# Patient Record
Sex: MALE | Race: WHITE | NOT HISPANIC OR LATINO | URBAN - METROPOLITAN AREA
[De-identification: names, ages, dates, MRNs, and addresses within clinical notes are randomized per-mention and may not be internally consistent; named-entity substitution may affect disease eponyms.]

---

## 2017-08-16 ENCOUNTER — FOLLOW UP ESTABLISHED (OUTPATIENT)
Dept: URBAN - METROPOLITAN AREA CLINIC 51 | Facility: CLINIC | Age: 77
End: 2017-08-16
Payer: MEDICARE

## 2017-08-16 DIAGNOSIS — H04.123 TEAR FILM INSUFFICIENCY OF BILATERAL LACRIMAL GLANDS: Primary | ICD-10-CM

## 2017-08-16 PROCEDURE — 92134 CPTRZ OPH DX IMG PST SGM RTA: CPT | Performed by: OPTOMETRIST

## 2017-08-16 PROCEDURE — 92014 COMPRE OPH EXAM EST PT 1/>: CPT | Performed by: OPTOMETRIST

## 2017-08-16 RX ORDER — BRIMONIDINE TARTRATE 1.5 MG/ML
0.15 % SOLUTION/ DROPS OPHTHALMIC
Qty: 1 | Refills: 3 | Status: INACTIVE
Start: 2017-08-16 | End: 2018-08-22

## 2017-08-16 ASSESSMENT — INTRAOCULAR PRESSURE
OD: 12
OS: 12

## 2018-08-22 ENCOUNTER — FOLLOW UP ESTABLISHED (OUTPATIENT)
Dept: URBAN - METROPOLITAN AREA CLINIC 51 | Facility: CLINIC | Age: 78
End: 2018-08-22
Payer: MEDICARE

## 2018-08-22 DIAGNOSIS — H35.372 PUCKERING OF MACULA, LEFT EYE: Primary | ICD-10-CM

## 2018-08-22 DIAGNOSIS — Z96.1 PRESENCE OF INTRAOCULAR LENS: ICD-10-CM

## 2018-08-22 DIAGNOSIS — H02.834 DERMATOCHALASIS OF LEFT UPPER LID: ICD-10-CM

## 2018-08-22 DIAGNOSIS — H02.831 DERMATOCHALASIS OF RIGHT UPPER LID: ICD-10-CM

## 2018-08-22 PROCEDURE — 92014 COMPRE OPH EXAM EST PT 1/>: CPT | Performed by: OPTOMETRIST

## 2018-08-22 PROCEDURE — 92134 CPTRZ OPH DX IMG PST SGM RTA: CPT | Performed by: OPTOMETRIST

## 2018-08-22 ASSESSMENT — INTRAOCULAR PRESSURE
OD: 15
OS: 14

## 2018-08-22 ASSESSMENT — VISUAL ACUITY
OD: 20/20
OS: 20/20

## 2021-07-06 ENCOUNTER — OFFICE VISIT (OUTPATIENT)
Dept: URBAN - METROPOLITAN AREA CLINIC 51 | Facility: CLINIC | Age: 81
End: 2021-07-06
Payer: MEDICARE

## 2021-07-06 DIAGNOSIS — H43.313 VITREOUS MEMBRANES AND STRANDS, BILATERAL: ICD-10-CM

## 2021-07-06 DIAGNOSIS — H52.4 PRESBYOPIA: ICD-10-CM

## 2021-07-06 DIAGNOSIS — H35.61 RETINAL HEMORRHAGE, RIGHT EYE: ICD-10-CM

## 2021-07-06 PROCEDURE — 92134 CPTRZ OPH DX IMG PST SGM RTA: CPT | Performed by: OPTOMETRIST

## 2021-07-06 PROCEDURE — 92014 COMPRE OPH EXAM EST PT 1/>: CPT | Performed by: OPTOMETRIST

## 2021-07-06 ASSESSMENT — VISUAL ACUITY
OS: 20/40
OD: 20/30

## 2021-07-06 ASSESSMENT — KERATOMETRY
OS: 42.27
OD: 42.41

## 2021-07-06 ASSESSMENT — INTRAOCULAR PRESSURE
OS: 14
OD: 14

## 2021-07-06 NOTE — IMPRESSION/PLAN
Impression: Retinal hemorrhage, right eye: H35.61. Plan: New small flame hemorrhage OD only. Educated patient on condition. Patient reports BP is wnl. but states his cholesterol was previously elevated and is working with his PCP to get it better controlled. Follow up with PCP as scheduled. Will recheck in 3-4 months.

## 2021-07-06 NOTE — IMPRESSION/PLAN
Impression: Presence of intraocular lens ; OU Plan: Clear and centered IOL's OU. Open capsules. Stable.

## 2021-07-06 NOTE — IMPRESSION/PLAN
Impression: Puckering of macula, left eye Plan: Mild thickening, no CME OS. Does appear to be affecting patients vision at this time. Will plan to recheck in 3-4 months to see if worsening/ or if vision is worsening. If appears worse at next appointment, may need to see retina for surgical consult/ further evaluation. RTC if notice changes in vision sooner.